# Patient Record
Sex: MALE
[De-identification: names, ages, dates, MRNs, and addresses within clinical notes are randomized per-mention and may not be internally consistent; named-entity substitution may affect disease eponyms.]

---

## 2023-02-17 ENCOUNTER — NON-APPOINTMENT (OUTPATIENT)
Age: 58
End: 2023-02-17

## 2023-02-17 ENCOUNTER — APPOINTMENT (OUTPATIENT)
Dept: CARDIOLOGY | Facility: CLINIC | Age: 58
End: 2023-02-17
Payer: COMMERCIAL

## 2023-02-17 VITALS
BODY MASS INDEX: 30.29 KG/M2 | TEMPERATURE: 97.7 F | RESPIRATION RATE: 17 BRPM | WEIGHT: 193 LBS | SYSTOLIC BLOOD PRESSURE: 123 MMHG | HEART RATE: 101 BPM | DIASTOLIC BLOOD PRESSURE: 77 MMHG | OXYGEN SATURATION: 96 % | HEIGHT: 67 IN

## 2023-02-17 DIAGNOSIS — E78.5 HYPERLIPIDEMIA, UNSPECIFIED: ICD-10-CM

## 2023-02-17 DIAGNOSIS — I10 ESSENTIAL (PRIMARY) HYPERTENSION: ICD-10-CM

## 2023-02-17 DIAGNOSIS — R06.00 DYSPNEA, UNSPECIFIED: ICD-10-CM

## 2023-02-17 DIAGNOSIS — R93.1 ABNORMAL FINDINGS ON DIAGNOSTIC IMAGING OF HEART AND CORONARY CIRCULATION: ICD-10-CM

## 2023-02-17 PROBLEM — Z00.00 ENCOUNTER FOR PREVENTIVE HEALTH EXAMINATION: Status: ACTIVE | Noted: 2023-02-17

## 2023-02-17 PROCEDURE — 99205 OFFICE O/P NEW HI 60 MIN: CPT | Mod: 25

## 2023-02-17 PROCEDURE — 93000 ELECTROCARDIOGRAM COMPLETE: CPT

## 2023-02-17 NOTE — REASON FOR VISIT
[FreeTextEntry1] : Initial cardiology office visit for second opinion regarding complaints of dyspnea with exertion, elevated coronary artery calcium score, hyperlipidemia , HTN and strong family history of coronary artery disease.\par \par

## 2023-02-17 NOTE — CARDIOLOGY SUMMARY
[de-identified] : Feb 17, 2023. Sinus  Rhythm \par -RSR(V1) -nondiagnostic. \par no significant ST changes \par

## 2023-02-17 NOTE — DISCUSSION/SUMMARY
[FreeTextEntry1] : 57-year-old man comes today to the office as a second opinion for cardiology evaluation.\par He has complaints of dyspnea with exertion.  There is also a strong family history of coronary artery disease with 2 brothers including his twin brother who had coronary intervention.\par Coronary calcium score >400, elevated LDL . +HTN\par Stress echocardiogram is reported to be normal.\par EKG on today's exam is normal.\par Blood pressure is normal.  There is no JVD.  Lung fields are clear.  No edema.  He is euvolemic.\par The following is recommended.\par \par Plan\par 1.  As he is symptomatic and has hyperlipidemia and also elevated coronary artery calcium score and strong family history of coronary artery disease it would be reasonable to pursue further imaging with a coronary CTA.  This test is ordered and will be done in the near future pending insurance authorization.\par 2.  Current medication list is reviewed, no changes.\par 3.  Advised to limit activities until completion of cardiac work-up.\par 4.  He will be forwarding us results of his prior work-up so that it can be charted.\par 5.  Further cardiac recommendations will depend results of coronary CTA.\par The above was reviewed with him, all questions answered.\par

## 2023-02-17 NOTE — HISTORY OF PRESENT ILLNESS
[FreeTextEntry1] : The patient is a 57-year-old man comes today to the office as a self-referral for cardiology evaluation, second opinion.  He had been seeing Dr. Jansen in [facility].\par He is concerned regarding a strong family history of heart disease, as detailed below.\par He has been having complaints of dyspnea with exertion.  He states that if he exerts himself to a moderate degree, he does become dyspneic.  This can happen after climbing a flight of stairs.  He has no resting dyspnea.\par He has no complaints of chest pain or chest pressure.  No palpitations.  No edema.  No orthopnea.  No PND.\par He has had 2 syncopal episodes in his life, both occurred while he was drinking alcohol and smoking marijuana.\par He has a strong family history of coronary artery disease.\par A 57-year-old brother recently had an MI and had coronary intervention on 2 vessels.\par His 55-year-old twin brother underwent recent cardiac catheterization and also had coronary intervention.\par He did have a coronary artery calcium score study and this was abnormal… 400s.  A stress echocardiogram was also done which he states was normal without evidence of ischemia.\par \par Past history: He denies any prior cardiac history.  No history of CAD, MI, CHF, arrhythmias or heart murmurs.\par He does have hypertension and hyperlipidemia recently diagnosed.\par He has anxiety and depression.\par Never previously hospitalized.\par Current medications: Benicar, rosuvastatin, Wellbutrin, lamotrigine, allopurinol.\par Allergies: None.\par Social history: He does not smoke cigarettes.  Social alcohol.   .  3 children ages 16 and 18 and 21.  He works as a consultant.\par Family history: Coronary artery disease in his 2 brothers as noted above.\par \par  Normal muscle tone/strength

## 2023-03-20 ENCOUNTER — NON-APPOINTMENT (OUTPATIENT)
Age: 58
End: 2023-03-20

## 2023-03-20 ENCOUNTER — OUTPATIENT (OUTPATIENT)
Dept: OUTPATIENT SERVICES | Facility: HOSPITAL | Age: 58
LOS: 1 days | End: 2023-03-20
Payer: COMMERCIAL

## 2023-03-20 ENCOUNTER — APPOINTMENT (OUTPATIENT)
Dept: CT IMAGING | Facility: CLINIC | Age: 58
End: 2023-03-20
Payer: COMMERCIAL

## 2023-03-20 DIAGNOSIS — I10 ESSENTIAL (PRIMARY) HYPERTENSION: ICD-10-CM

## 2023-03-20 DIAGNOSIS — E78.5 HYPERLIPIDEMIA, UNSPECIFIED: ICD-10-CM

## 2023-03-20 DIAGNOSIS — R93.1 ABNORMAL FINDINGS ON DIAGNOSTIC IMAGING OF HEART AND CORONARY CIRCULATION: ICD-10-CM

## 2023-03-20 DIAGNOSIS — R06.00 DYSPNEA, UNSPECIFIED: ICD-10-CM

## 2023-03-20 PROCEDURE — 75574 CT ANGIO HRT W/3D IMAGE: CPT | Mod: 26

## 2023-03-20 PROCEDURE — 75574 CT ANGIO HRT W/3D IMAGE: CPT

## 2023-03-20 RX ORDER — ALLOPURINOL 200 MG/1
TABLET ORAL
Refills: 0 | Status: ACTIVE | COMMUNITY

## 2023-03-20 RX ORDER — OLMESARTAN MEDOXOMIL-HYDROCHLOROTHIAZIDE 25; 40 MG/1; MG/1
TABLET, FILM COATED ORAL
Refills: 0 | Status: ACTIVE | COMMUNITY

## 2023-03-20 RX ORDER — BUPROPION HYDROCHLORIDE 100 MG/1
TABLET, FILM COATED ORAL
Refills: 0 | Status: ACTIVE | COMMUNITY

## 2023-03-20 RX ORDER — ROSUVASTATIN CALCIUM 5 MG/1
TABLET, FILM COATED ORAL
Refills: 0 | Status: ACTIVE | COMMUNITY

## 2023-03-20 RX ORDER — LAMOTRIGINE 150 MG/1
TABLET ORAL
Refills: 0 | Status: ACTIVE | COMMUNITY

## 2023-03-21 ENCOUNTER — TRANSCRIPTION ENCOUNTER (OUTPATIENT)
Age: 58
End: 2023-03-21

## 2023-03-22 ENCOUNTER — TRANSCRIPTION ENCOUNTER (OUTPATIENT)
Age: 58
End: 2023-03-22